# Patient Record
Sex: FEMALE | Race: WHITE | Employment: FULL TIME | URBAN - METROPOLITAN AREA
[De-identification: names, ages, dates, MRNs, and addresses within clinical notes are randomized per-mention and may not be internally consistent; named-entity substitution may affect disease eponyms.]

---

## 2019-09-24 ENCOUNTER — OFFICE VISIT (OUTPATIENT)
Dept: OBGYN CLINIC | Age: 24
End: 2019-09-24
Payer: COMMERCIAL

## 2019-09-24 VITALS
HEART RATE: 77 BPM | HEIGHT: 64 IN | BODY MASS INDEX: 23.37 KG/M2 | SYSTOLIC BLOOD PRESSURE: 100 MMHG | DIASTOLIC BLOOD PRESSURE: 63 MMHG | WEIGHT: 136.9 LBS

## 2019-09-24 DIAGNOSIS — N94.6 DYSMENORRHEA: ICD-10-CM

## 2019-09-24 DIAGNOSIS — Z30.432 ENCOUNTER FOR IUD REMOVAL: Primary | ICD-10-CM

## 2019-09-24 PROCEDURE — G8420 CALC BMI NORM PARAMETERS: HCPCS | Performed by: ADVANCED PRACTICE MIDWIFE

## 2019-09-24 PROCEDURE — 1036F TOBACCO NON-USER: CPT | Performed by: ADVANCED PRACTICE MIDWIFE

## 2019-09-24 PROCEDURE — G8427 DOCREV CUR MEDS BY ELIG CLIN: HCPCS | Performed by: ADVANCED PRACTICE MIDWIFE

## 2019-09-24 PROCEDURE — 58301 REMOVE INTRAUTERINE DEVICE: CPT | Performed by: ADVANCED PRACTICE MIDWIFE

## 2019-09-24 PROCEDURE — 99202 OFFICE O/P NEW SF 15 MIN: CPT | Performed by: ADVANCED PRACTICE MIDWIFE

## 2019-09-24 RX ORDER — NORETHINDRONE ACETATE AND ETHINYL ESTRADIOL 1MG-20(21)
1 KIT ORAL DAILY
Qty: 1 PACKET | Refills: 2 | Status: SHIPPED | OUTPATIENT
Start: 2019-09-24 | End: 2020-09-15 | Stop reason: ALTCHOICE

## 2019-09-24 ASSESSMENT — ENCOUNTER SYMPTOMS
ABDOMINAL PAIN: 0
DIARRHEA: 0
NAUSEA: 0
SHORTNESS OF BREATH: 0
VOMITING: 0

## 2020-09-11 ENCOUNTER — TELEPHONE (OUTPATIENT)
Dept: FAMILY MEDICINE CLINIC | Age: 25
End: 2020-09-11

## 2020-09-15 ENCOUNTER — OFFICE VISIT (OUTPATIENT)
Dept: OBGYN CLINIC | Age: 25
End: 2020-09-15

## 2020-09-15 VITALS
DIASTOLIC BLOOD PRESSURE: 69 MMHG | HEART RATE: 85 BPM | BODY MASS INDEX: 22.09 KG/M2 | WEIGHT: 129.4 LBS | SYSTOLIC BLOOD PRESSURE: 100 MMHG | HEIGHT: 64 IN

## 2020-09-15 PROBLEM — O09.90 HIGH-RISK PREGNANCY: Status: ACTIVE | Noted: 2020-09-15

## 2020-09-15 PROBLEM — Z98.891 HISTORY OF CESAREAN DELIVERY: Status: ACTIVE | Noted: 2020-09-15

## 2020-09-15 LAB
CONTROL: PRESENT
PREGNANCY TEST URINE, POC: POSITIVE

## 2020-09-15 PROCEDURE — 99213 OFFICE O/P EST LOW 20 MIN: CPT | Performed by: ADVANCED PRACTICE MIDWIFE

## 2020-09-15 PROCEDURE — 81025 URINE PREGNANCY TEST: CPT | Performed by: ADVANCED PRACTICE MIDWIFE

## 2020-09-15 RX ORDER — LANOLIN ALCOHOL/MO/W.PET/CERES
50 CREAM (GRAM) TOPICAL DAILY
COMMUNITY
End: 2021-09-22 | Stop reason: ALTCHOICE

## 2020-09-15 NOTE — PROGRESS NOTES
[facility] [address] [address]  Dept: [phone]    Patient Name: Stef Villaseñor  Patient Age: 23 y.o. Date of Visit: 9/15/2020    SUBJECTIVE:    Chief Complaint:  Chief Complaint   Patient presents with    Amenorrhea     lmp 20, urine pregnancy test positive       HPI:    Sirisha Hannon  is being seen today for missed menses. She reports a  positive pregnancy test on a week. This  is a planned pregnancy. She is  accepting at this time. LMP: Patient's last menstrual period was 2020 (exact date). Sure and definite: Yes    28 day cycle: Yes    She was not on a contraceptive at the time of conception. Estimated weeks gestation today : 5w5d  Tentative BERNADETTE: 2020    Relationship with FOB: germán    Patient reports concerns: no    OB History        3    Para   2    Term   2            AB        Living   2       SAB        TAB        Ectopic        Molar        Multiple        Live Births                  History reviewed. No pertinent past medical history. Current Outpatient Medications   Medication Sig Dispense Refill    vitamin B-6 (PYRIDOXINE) 50 MG tablet Take 50 mg by mouth daily       No current facility-administered medications for this visit.           OBJECTIVE:    /69 (Site: Right Upper Arm, Position: Sitting, Cuff Size: Medium Adult)   Pulse 85   Ht 5' 4\" (1.626 m)   Wt 129 lb 6.4 oz (58.7 kg)   LMP 2020 (Exact Date)   BMI 22.21 kg/m²     Mother's ethnicity:     Father's ethnicity:       She is complaining today of:   Pain: No  Cramping: No  Bleeding or spotting: No    Nausea: Yes  Vomiting: No    Breast enlargement/tenderness: No  Fatigue: No  Frequency of urination: No    Previous high risk obstetrical history: yes  OB History    Para Term  AB Living   3 2 2     2   SAB TAB Ectopic Molar Multiple Live Births                    # Outcome Date GA Lbr Leroy/2nd Weight

## 2020-09-23 ENCOUNTER — HOSPITAL ENCOUNTER (OUTPATIENT)
Dept: ULTRASOUND IMAGING | Facility: CLINIC | Age: 25
Discharge: HOME | End: 2020-09-25

## 2020-09-23 PROCEDURE — 76801 OB US < 14 WKS SINGLE FETUS: CPT

## 2020-09-23 PROCEDURE — 76817 TRANSVAGINAL US OBSTETRIC: CPT

## 2020-10-05 ENCOUNTER — HOSPITAL ENCOUNTER (EMERGENCY)
Facility: CLINIC | Age: 25
Discharge: HOME | End: 2020-10-06
Attending: EMERGENCY MEDICINE

## 2020-10-05 VITALS
WEIGHT: 125 LBS | HEART RATE: 75 BPM | RESPIRATION RATE: 16 BRPM | DIASTOLIC BLOOD PRESSURE: 77 MMHG | BODY MASS INDEX: 21.46 KG/M2 | TEMPERATURE: 98.4 F | SYSTOLIC BLOOD PRESSURE: 122 MMHG | OXYGEN SATURATION: 98 %

## 2020-10-05 PROCEDURE — 99284 EMERGENCY DEPT VISIT MOD MDM: CPT

## 2020-10-05 PROCEDURE — 99283 EMERGENCY DEPT VISIT LOW MDM: CPT

## 2020-10-05 ASSESSMENT — PAIN SCALES - GENERAL: PAINLEVEL_OUTOF10: 6

## 2020-10-05 ASSESSMENT — ENCOUNTER SYMPTOMS
RESPIRATORY NEGATIVE: 1
ABDOMINAL PAIN: 1
ALLERGIC/IMMUNOLOGIC NEGATIVE: 1
EYES NEGATIVE: 1
NAUSEA: 1

## 2020-10-05 ASSESSMENT — PAIN DESCRIPTION - LOCATION: LOCATION: ABDOMEN

## 2020-10-05 NOTE — LETTER
Harris Jolly was seen and treated in our emergency department on 10/5/2020. She may return to work on 10/7/2020    If you have any questions or concerns, please don't hesitate to call.       Shirlene Johnson RN

## 2020-10-06 ENCOUNTER — OFFICE VISIT (OUTPATIENT)
Dept: OBGYN CLINIC | Age: 25
End: 2020-10-06

## 2020-10-06 ENCOUNTER — HOSPITAL ENCOUNTER (OUTPATIENT)
Age: 25
Setting detail: SPECIMEN
Discharge: HOME | End: 2020-10-06

## 2020-10-06 VITALS
DIASTOLIC BLOOD PRESSURE: 66 MMHG | WEIGHT: 127.1 LBS | SYSTOLIC BLOOD PRESSURE: 100 MMHG | BODY MASS INDEX: 21.82 KG/M2 | HEART RATE: 70 BPM

## 2020-10-06 LAB
-: ABNORMAL
AMORPHOUS: ABNORMAL
BACTERIA: ABNORMAL
BILIRUBIN URINE: NEGATIVE
CASTS UA: ABNORMAL /LPF (ref 0–2)
COLOR: YELLOW
COMMENT UA: ABNORMAL
CRYSTALS, UA: ABNORMAL /HPF
EPITHELIAL CELLS UA: ABNORMAL /HPF (ref 0–5)
GLUCOSE URINE: NEGATIVE
KETONES, URINE: NEGATIVE
LEUKOCYTE ESTERASE, URINE: NEGATIVE
MUCUS: ABNORMAL
NITRITE, URINE: NEGATIVE
OTHER OBSERVATIONS UA: ABNORMAL
PH UA: 6.5 (ref 5–8)
PROTEIN UA: NEGATIVE
RBC UA: ABNORMAL /HPF (ref 0–2)
RENAL EPITHELIAL, UA: ABNORMAL /HPF
SPECIFIC GRAVITY UA: 1.03 (ref 1–1.03)
TRICHOMONAS: ABNORMAL
TURBIDITY: ABNORMAL
URINE HGB: ABNORMAL
UROBILINOGEN, URINE: ABNORMAL
WBC UA: ABNORMAL /HPF (ref 0–5)
YEAST: ABNORMAL

## 2020-10-06 PROCEDURE — 99213 OFFICE O/P EST LOW 20 MIN: CPT | Performed by: ADVANCED PRACTICE MIDWIFE

## 2020-10-06 PROCEDURE — 6370000000 HC RX 637 (ALT 250 FOR IP): Performed by: EMERGENCY MEDICINE

## 2020-10-06 PROCEDURE — 81001 URINALYSIS AUTO W/SCOPE: CPT

## 2020-10-06 RX ORDER — ACETAMINOPHEN 325 MG/1
650 TABLET ORAL ONCE
Status: COMPLETED | OUTPATIENT
Start: 2020-10-06 | End: 2020-10-06

## 2020-10-06 RX ADMIN — ACETAMINOPHEN 650 MG: 325 TABLET ORAL at 00:36

## 2020-10-06 ASSESSMENT — PAIN SCALES - GENERAL: PAINLEVEL_OUTOF10: 8

## 2020-10-06 NOTE — ED PROVIDER NOTES
eMERGENCY dEPARTMENT eNCOUnter      Pt Name: Alexis Jordan  MRN: [id number]  [place] 1995  Date of evaluation: 10/5/2020      CHIEF COMPLAINT       Chief Complaint   Patient presents with    Abdominal Pain     8 weeks pregnant          HISTORY OF PRESENT ILLNESS    Alexis Jordan is a 23 y.o. female who presents to the emergency department who is complaining of mid and upper abdominal pain. Patient is 8 weeks pregnant she has no vaginal discharge she has no urinary symptoms at this point in time she states that her appetite is been a little bit down. Does complain of some slight nausea as well. Patient has had an ultrasound at 6 weeks which showed a normal IUP. REVIEW OF SYSTEMS       Review of Systems   Constitutional: Negative. HENT: Negative. Eyes: Negative. Respiratory: Negative. Cardiovascular: Negative. Gastrointestinal: Positive for abdominal pain and nausea. Endocrine: Negative. Genitourinary: Negative. Musculoskeletal: Negative. Skin: Negative. Allergic/Immunologic: Negative. Neurological: Negative. Hematological: Negative. Psychiatric/Behavioral: Negative. PAST MEDICAL HISTORY    has no past medical history on file. SURGICAL HISTORY      has a past surgical history that includes  section () and  section (). CURRENT MEDICATIONS       Previous Medications    VITAMIN B-6 (PYRIDOXINE) 50 MG TABLET    Take 50 mg by mouth daily       ALLERGIES     has No Known Allergies. FAMILY HISTORY     She indicated that her maternal grandmother is . family history includes Cancer in her maternal grandmother. SOCIAL HISTORY      reports that she has never smoked. She has never used smokeless tobacco. She reports current alcohol use. She reports that she does not use drugs. PHYSICAL EXAM     INITIAL VITALS:  weight is 56.7 kg (125 lb). Her temperature is 98.4 °F (36.9 °C).  Her blood pressure is 122/77 and her pulse is 75. Her respiration is 16 and oxygen saturation is 98%. Constitutional: Alert, oriented x3, nontoxic, afebrile, answering questions appropriately, acting properly for age, in no acute distress  HEENT: Extraocular muscles intact, mucus membranes moist, TMs clear bilaterally, no posterior pharyngeal erythema or exudates, Pupils equal, round, reactive to light,   Neck: Trachea midline, Supple without lymphadenopathy, no posterior midline neck tenderness to palpation  Cardiovascular: Regular rhythm and rate no S3, S4, or murmurs  Respiratory: Clear to auscultation bilaterally no wheezes, rhonchi, rales, no respiratory distress  Gastrointestinal: Soft, nontender, nondistended, positive bowel sounds. No rebound, rigidity, or guarding. Musculoskeletal: No extremity pain or swelling  Neurologic: Moving all 4 extremities without difficulty there are no gross focal neurologic deficits  Skin: Warm and dry      DIFFERENTIAL DIAGNOSIS/ MDM:     Lower abdominal pain uncertain etiology.   Patient has had an ultrasound that showed a 6-week viable IUP so we not worried about an ectopic at this point time this may be a UTI or could be ovarian cyst.    DIAGNOSTIC RESULTS     EKG: All EKG's are interpreted by the Emergency Department Physician who either signs or Co-signs this chart in the absence of a cardiologist.        Not indicated unless otherwise documented above    LABS:  Results for orders placed or performed during the hospital encounter of 10/05/20   Urinalysis Reflex to Culture    Specimen: Urine, clean catch   Result Value Ref Range    Color, UA YELLOW YELLOW    Turbidity UA SLIGHTLY CLOUDY (A) CLEAR    Glucose, Ur NEGATIVE NEGATIVE    Bilirubin Urine NEGATIVE NEGATIVE    Ketones, Urine NEGATIVE NEGATIVE    Specific Gravity, UA 1.027 1.005 - 1.030    Urine Hgb TRACE (A) NEGATIVE    pH, UA 6.5 5.0 - 8.0    Protein, UA NEGATIVE NEGATIVE    Urobilinogen, Urine ELEVATED (A) Normal    Nitrite, Urine NEGATIVE NEGATIVE Leukocyte Esterase, Urine NEGATIVE NEGATIVE    Urinalysis Comments NOT REPORTED    Microscopic Urinalysis   Result Value Ref Range    -          WBC, UA 2 TO 5 0 - 5 /HPF    RBC, UA 2 TO 5 0 - 2 /HPF    Casts UA NOT REPORTED 0 - 2 /LPF    Crystals, UA NOT REPORTED None /HPF    Epithelial Cells UA 2 TO 5 0 - 5 /HPF    Renal Epithelial, UA NOT REPORTED 0 /HPF    Bacteria, UA MODERATE (A) None    Mucus, UA 2+ (A) None    Trichomonas, UA NOT REPORTED None    Amorphous, UA NOT REPORTED None    Other Observations UA (A) NOT REQ. Utilizing a urinalysis as the only screening method to exclude a potential uropathogen can be unreliable in many patient populations. Rapid screening tests are less sensitive than culture and if UTI is a clinical possibility, culture should be considered despite a negative urinalysis. Yeast, UA NOT REPORTED None       Not indicated unless otherwise documented above    RADIOLOGY:   I reviewed the radiologist interpretations:  No orders to display       Not indicated unless otherwise documented above    EMERGENCY DEPARTMENT COURSE:     The patient was given the following medications:  Orders Placed This Encounter   Medications    acetaminophen (TYLENOL) tablet 650 mg        Vitals:    Vitals:    10/05/20 2326 10/05/20 2329   BP: 122/77    Pulse: 75    Resp: 16    Temp: 98.4 °F (36.9 °C)    SpO2: 98%    Weight:  56.7 kg (125 lb)     -------------------------  /77   Pulse 75   Temp 98.4 °F (36.9 °C)   Resp 16   Wt 56.7 kg (125 lb)   LMP 08/06/2020 (Exact Date)   SpO2 98%   BMI 21.46 kg/m²         I have reviewed the disposition diagnosis with the patient and or their family/guardian. I have answered their questions and given discharge instructions. They voiced understanding of these instructions and did not have any further questions or complaints.     CRITICAL CARE:    None    CONSULTS:    None    PROCEDURES:    None      OARRS Report if indicated             FINAL IMPRESSION 1. Lower abdominal pain          DISPOSITION/PLAN   DISPOSITION Decision To Discharge    I have reviewed the disposition diagnosis with the patient and or their family/guardian. I have answered their questions and given discharge instructions. They voiced understanding of these instructions and did not have any further questions or complaints. Reevaluation:  Patient is feeling symptomatically better after Tylenol urinalysis does not show any obvious UTI there is some bacteria we will allow the midwife to put her on antibiotics tomorrow she needs to do that. Patient states she will go see the midwife tomorrow and feels comfortable going home tonight.     PATIENT REFERRED TO:  Kaleigh Haines, [address] [facility] [address]  [phone]    In 1 day        DISCHARGE MEDICATIONS:  New Prescriptions    No medications on file       (Please note that portions of this note were completed with a voice recognition program.  Efforts were made to edit the dictations but occasionally words are mis-transcribed.)    Bradley MD  Attending Emergency Physician            Rebeca Gannon MD  10/06/20 [id number]

## 2020-10-07 LAB
CULTURE: NORMAL
Lab: NORMAL
SPECIMEN DESCRIPTION: NORMAL

## 2020-10-12 ENCOUNTER — HOSPITAL ENCOUNTER (OUTPATIENT)
Age: 25
Setting detail: SPECIMEN
Discharge: HOME | End: 2020-10-12

## 2020-10-12 ENCOUNTER — INITIAL PRENATAL (OUTPATIENT)
Dept: OBGYN CLINIC | Age: 25
End: 2020-10-12

## 2020-10-12 VITALS
DIASTOLIC BLOOD PRESSURE: 69 MMHG | HEART RATE: 73 BPM | TEMPERATURE: 97.3 F | BODY MASS INDEX: 22.21 KG/M2 | SYSTOLIC BLOOD PRESSURE: 108 MMHG | WEIGHT: 129.4 LBS

## 2020-10-12 LAB
AMPHETAMINE SCREEN URINE: NEGATIVE
BARBITURATE SCREEN URINE: NEGATIVE
BENZODIAZEPINE SCREEN, URINE: NEGATIVE
BUPRENORPHINE URINE: NORMAL
CANNABINOID SCREEN URINE: NEGATIVE
COCAINE METABOLITE, URINE: NEGATIVE
MDMA URINE: NORMAL
METHADONE SCREEN, URINE: NEGATIVE
METHAMPHETAMINE, URINE: NORMAL
OPIATES, URINE: NEGATIVE
OXYCODONE SCREEN URINE: NEGATIVE
PHENCYCLIDINE, URINE: NEGATIVE
PROPOXYPHENE, URINE: NORMAL
TEST INFORMATION: NORMAL
TRICYCLIC ANTIDEPRESSANTS, UR: NORMAL

## 2020-10-12 PROCEDURE — 99211 OFF/OP EST MAY X REQ PHY/QHP: CPT | Performed by: ADVANCED PRACTICE MIDWIFE

## 2020-10-12 PROCEDURE — 36415 COLL VENOUS BLD VENIPUNCTURE: CPT | Performed by: ADVANCED PRACTICE MIDWIFE

## 2020-10-12 NOTE — LETTER
[facility]) [facility]   [address] [address] [address] [address]  Phone: [phone]  Fax: [phone]        October 12, 2020     Patient: Juice Hernandez   YOB: 1995   Date of Visit: 10/12/2020       To Whom it May Concern:    Juice Hernandez was seen in my clinic on 10/12/2020. She is currently pregnant. It is my medical opinion that she not lift greater than 20 lbs. If you have any questions or concerns, please don't hesitate to call. Sincerely,         BRENNEN Jacques

## 2020-10-12 NOTE — PROGRESS NOTES
CC: ER follow up    HPI: Josesito Dumont presents to the office for an ER follow up. She was seen in the ER 10/5/20 for abdominal pain. States they did not do any US and she does not know if her baby is ok or not. The abdominal pain she describes is epigastric and gets worse after eating. Was told in ER and per ER notes that UA was not high suspicion for UTI but if we wanted her to have antibiotics CNM would prescribe. Pt denies UTI s/s currently. Pt 8 weeks 5days pregnant. Denies vaginal bleeding.     O:./66 (Site: Left Upper Arm, Position: Supine, Cuff Size: Medium Adult)   Pulse 70   Wt 127 lb 1.6 oz (57.7 kg)   LMP 08/06/2020 (Exact Date)   BMI 21.82 kg/m²   Abdomen soft non tender to touch  FHTs present by BSUS    A: Abdominal pain   Likely GERD  +FHTs    P: discussed OTC remedies for GERD and diet precautions  Needs OB hx appt f/u in 1 week  UC ordered but low suspicion for UTI  Warning signs reviewed    More than 50% of 15min appt was spent counseling on the above
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
## 2020-10-12 NOTE — LETTER
[facility]   [address] [address] [address] [address]  Phone: [phone]  Fax: [phone]        October 12, 2020     Patient: Ankur New   YOB: 1995   Date of Visit: 10/12/2020       To Whom it May Concern:    Ankur New was seen in my clinic on 10/12/2020. She is pregnant with an estimated due date of May 13, 2021. If you have any questions or concerns, please don't hesitate to call.     Sincerely,         Mirela Nolen RN

## 2020-10-13 LAB
ABO/RH: NORMAL
ABSOLUTE EOS #: 0.05 K/UL (ref 0–0.44)
ABSOLUTE IMMATURE GRANULOCYTE: <0.03 K/UL (ref 0–0.3)
ABSOLUTE LYMPH #: 2.22 K/UL (ref 1.1–3.7)
ABSOLUTE MONO #: 0.56 K/UL (ref 0.1–1.2)
ANTIBODY SCREEN: NEGATIVE
BASOPHILS # BLD: 0 % (ref 0–2)
BASOPHILS ABSOLUTE: 0.03 K/UL (ref 0–0.2)
CULTURE: NO GROWTH
DIFFERENTIAL TYPE: ABNORMAL
EOSINOPHILS RELATIVE PERCENT: 1 % (ref 1–4)
HCT VFR BLD CALC: 38.5 % (ref 36.3–47.1)
HEMOGLOBIN: 12.2 G/DL (ref 11.9–15.1)
HEPATITIS B SURFACE ANTIGEN: NONREACTIVE
HIV AG/AB: NONREACTIVE
IMMATURE GRANULOCYTES: 0 %
LYMPHOCYTES # BLD: 25 % (ref 24–43)
Lab: NORMAL
MCH RBC QN AUTO: 28.8 PG (ref 25.2–33.5)
MCHC RBC AUTO-ENTMCNC: 31.7 G/DL (ref 28.4–34.8)
MCV RBC AUTO: 91 FL (ref 82.6–102.9)
MONOCYTES # BLD: 6 % (ref 3–12)
NRBC AUTOMATED: 0 PER 100 WBC
PDW BLD-RTO: 13.3 % (ref 11.8–14.4)
PLATELET # BLD: 181 K/UL (ref 138–453)
PLATELET ESTIMATE: ABNORMAL
PMV BLD AUTO: 11.9 FL (ref 8.1–13.5)
RBC # BLD: 4.23 M/UL (ref 3.95–5.11)
RBC # BLD: ABNORMAL 10*6/UL
RUBV IGG SER QL: 98.7 IU/ML
SEG NEUTROPHILS: 68 % (ref 36–65)
SEGMENTED NEUTROPHILS ABSOLUTE COUNT: 5.88 K/UL (ref 1.5–8.1)
SPECIMEN DESCRIPTION: NORMAL
T. PALLIDUM, IGG: NONREACTIVE
WBC # BLD: 8.8 K/UL (ref 3.5–11.3)
WBC # BLD: ABNORMAL 10*3/UL

## 2020-10-15 LAB
C. TRACHOMATIS DNA ,URINE: NEGATIVE
N. GONORRHOEAE DNA, URINE: NEGATIVE
SPECIMEN DESCRIPTION: NORMAL

## 2020-10-19 ENCOUNTER — TELEPHONE (OUTPATIENT)
Dept: OBGYN CLINIC | Age: 25
End: 2020-10-19

## 2020-10-19 NOTE — TELEPHONE ENCOUNTER
Onelia not set up  Call pt to review prenatal labs normal  Just waiting on cystic fibrosis lab, we will have it back by her next visit.  Thanks

## 2020-10-21 LAB — CYSTIC FIBROSIS: NORMAL

## 2020-10-29 ENCOUNTER — ROUTINE PRENATAL (OUTPATIENT)
Dept: OBGYN CLINIC | Age: 25
End: 2020-10-29

## 2020-10-29 ENCOUNTER — HOSPITAL ENCOUNTER (OUTPATIENT)
Age: 25
Discharge: HOME | End: 2020-10-29

## 2020-10-29 VITALS
DIASTOLIC BLOOD PRESSURE: 70 MMHG | SYSTOLIC BLOOD PRESSURE: 110 MMHG | WEIGHT: 128.2 LBS | BODY MASS INDEX: 22.01 KG/M2 | HEART RATE: 86 BPM

## 2020-10-29 PROBLEM — Z62.810 HISTORY OF SEXUAL ABUSE IN CHILDHOOD: Status: ACTIVE | Noted: 2020-10-29

## 2020-10-29 PROCEDURE — 99212 OFFICE O/P EST SF 10 MIN: CPT | Performed by: ADVANCED PRACTICE MIDWIFE

## 2020-10-29 PROCEDURE — G0145 SCR C/V CYTO,THINLAYER,RESCR: HCPCS

## 2020-10-29 PROCEDURE — G0123 SCREEN CERV/VAG THIN LAYER: HCPCS

## 2020-10-29 PROCEDURE — 36415 COLL VENOUS BLD VENIPUNCTURE: CPT

## 2020-10-29 PROCEDURE — 87624 HPV HI-RISK TYP POOLED RSLT: CPT

## 2020-10-29 RX ORDER — OMEPRAZOLE 20 MG/1
20 CAPSULE, DELAYED RELEASE ORAL
Qty: 30 CAPSULE | Refills: 5 | Status: SHIPPED | OUTPATIENT
Start: 2020-10-29 | End: 2021-09-22

## 2020-10-29 SDOH — ECONOMIC STABILITY: INCOME INSECURITY: HOW HARD IS IT FOR YOU TO PAY FOR THE VERY BASICS LIKE FOOD, HOUSING, MEDICAL CARE, AND HEATING?: NOT HARD AT ALL

## 2020-10-29 SDOH — ECONOMIC STABILITY: FOOD INSECURITY: WITHIN THE PAST 12 MONTHS, YOU WORRIED THAT YOUR FOOD WOULD RUN OUT BEFORE YOU GOT MONEY TO BUY MORE.: NEVER TRUE

## 2020-10-29 SDOH — ECONOMIC STABILITY: TRANSPORTATION INSECURITY
IN THE PAST 12 MONTHS, HAS THE LACK OF TRANSPORTATION KEPT YOU FROM MEDICAL APPOINTMENTS OR FROM GETTING MEDICATIONS?: NO

## 2020-10-29 SDOH — ECONOMIC STABILITY: FOOD INSECURITY: WITHIN THE PAST 12 MONTHS, THE FOOD YOU BOUGHT JUST DIDN'T LAST AND YOU DIDN'T HAVE MONEY TO GET MORE.: NEVER TRUE

## 2020-10-29 SDOH — ECONOMIC STABILITY: TRANSPORTATION INSECURITY
IN THE PAST 12 MONTHS, HAS LACK OF TRANSPORTATION KEPT YOU FROM MEETINGS, WORK, OR FROM GETTING THINGS NEEDED FOR DAILY LIVING?: NO
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
## 2020-11-06 ENCOUNTER — ROUTINE PRENATAL (OUTPATIENT)
Dept: PERINATAL CARE | Age: 25
End: 2020-11-06

## 2020-11-06 VITALS
RESPIRATION RATE: 16 BRPM | HEART RATE: 100 BPM | BODY MASS INDEX: 21.97 KG/M2 | WEIGHT: 128 LBS | TEMPERATURE: 98.3 F | SYSTOLIC BLOOD PRESSURE: 101 MMHG | DIASTOLIC BLOOD PRESSURE: 67 MMHG

## 2020-11-06 LAB
CRL: NORMAL
CYTOLOGY REPORT: NORMAL
SAC DIAMETER: NORMAL

## 2020-11-06 PROCEDURE — 76813 OB US NUCHAL MEAS 1 GEST: CPT | Performed by: OBSTETRICS & GYNECOLOGY

## 2020-11-06 PROCEDURE — 76801 OB US < 14 WKS SINGLE FETUS: CPT | Performed by: OBSTETRICS & GYNECOLOGY

## 2020-11-12 LAB
HPV SOURCE: NORMAL
HPV, GENOTYPE 16: NOT DETECTED
HPV, GENOTYPE 18: DETECTED
HPV, HIGH RISK OTHER: DETECTED

## 2020-11-13 ENCOUNTER — TELEPHONE (OUTPATIENT)
Dept: OBGYN CLINIC | Age: 25
End: 2020-11-13

## 2020-11-13 PROBLEM — R87.610 ASCUS WITH POSITIVE HIGH RISK HPV CERVICAL: Status: ACTIVE | Noted: 2020-11-13

## 2020-11-13 PROBLEM — R87.810 ASCUS WITH POSITIVE HIGH RISK HPV CERVICAL: Status: ACTIVE | Noted: 2020-11-13

## 2020-12-08 ENCOUNTER — ROUTINE PRENATAL (OUTPATIENT)
Dept: OBGYN CLINIC | Age: 25
End: 2020-12-08

## 2020-12-08 VITALS
WEIGHT: 129 LBS | HEART RATE: 109 BPM | DIASTOLIC BLOOD PRESSURE: 83 MMHG | BODY MASS INDEX: 22.14 KG/M2 | SYSTOLIC BLOOD PRESSURE: 125 MMHG

## 2020-12-08 PROCEDURE — 99212 OFFICE O/P EST SF 10 MIN: CPT | Performed by: ADVANCED PRACTICE MIDWIFE

## 2020-12-08 ASSESSMENT — PATIENT HEALTH QUESTIONNAIRE - PHQ9
SUM OF ALL RESPONSES TO PHQ QUESTIONS 1-9: 0
1. LITTLE INTEREST OR PLEASURE IN DOING THINGS: 0
SUM OF ALL RESPONSES TO PHQ9 QUESTIONS 1 & 2: 0
2. FEELING DOWN, DEPRESSED OR HOPELESS: 0

## 2020-12-08 NOTE — LETTER
[address]  [address]  Phone: [phone]  Fax: [phone]    MARICRUZ Gong - OG        December 8, 2020     Patient: David Chandler   YOB: 1995   Date of Visit: 12/8/2020       To Whom It May Concern: It is my medical opinion that David Chandler may return to work on 12-14-20 with the following restrictions: pushing/pulling not to exceed 15 lb, and not more then 30 hrs per week lbs. .    If you have any questions or concerns, please don't hesitate to call.     Sincerely,        Lucien Gong

## 2020-12-08 NOTE — PROGRESS NOTES
SUBJECTIVE:    Ta Weinstein is here for her return OB visit. Reports having severe pelvic pain and talked with her work and desires to take this week off and reduce her hours to 30 from 40 hours. She reports  feeling fetal movement. She denies vaginal bleeding. She denies vaginal discharge. She denies leaking of fluid. She denies uterine cramping. She denies  nausea and/or vomiting. OBJECTIVE:  Blood pressure 125/83, pulse 109, weight 129 lb (58.5 kg), last menstrual period 08/06/2020. Total weight gain: 2 lb (0.907 kg)    Emily has not received the flu vaccine as appropriate. Declined offer at next visit  Ta Weinstein has not received the Tdap vaccine as appropriate    ASSESSMENT/PLAN:  IUP @ 17+5 weeks  S =D    1. High risk pregnancy, antepartum      2. 17 weeks gestation of pregnancy  - Reviewed fetal movement  - First trimester screen WNL  - Anatomy scan scheduled   - Prenatal labs completed  - Discussed flu shot, declines today     3. Pelvic pain affecting pregnancy in second trimester, antepartum  - Reviewed tylenol PRN  - Medical grade band ordered  - Work note given        She was counseled regarding all of the above:    Return in about 4 weeks (around 1/5/2021) for Return OB. Patient was seen with total face to face time of 15 minutes. More than 50% of this visit was education and counseling.     Electronically Signed By: Lucien Cline

## 2021-01-04 ENCOUNTER — ROUTINE PRENATAL (OUTPATIENT)
Dept: PERINATAL CARE | Age: 26
End: 2021-01-04

## 2021-01-04 VITALS
RESPIRATION RATE: 16 BRPM | TEMPERATURE: 97.2 F | WEIGHT: 136 LBS | BODY MASS INDEX: 23.22 KG/M2 | SYSTOLIC BLOOD PRESSURE: 93 MMHG | HEART RATE: 61 BPM | DIASTOLIC BLOOD PRESSURE: 57 MMHG | HEIGHT: 64 IN

## 2021-01-04 DIAGNOSIS — Z36.86 SCREENING, ANTENATAL, FOR RISK OF PRE-TERM LABOR: ICD-10-CM

## 2021-01-04 DIAGNOSIS — Z3A.21 21 WEEKS GESTATION OF PREGNANCY: ICD-10-CM

## 2021-01-04 DIAGNOSIS — Z13.89 ENCOUNTER FOR ROUTINE SCREENING FOR MALFORMATION USING ULTRASONICS: Primary | ICD-10-CM

## 2021-01-04 PROCEDURE — 76805 OB US >/= 14 WKS SNGL FETUS: CPT | Performed by: OBSTETRICS & GYNECOLOGY

## 2021-01-04 PROCEDURE — 76817 TRANSVAGINAL US OBSTETRIC: CPT | Performed by: OBSTETRICS & GYNECOLOGY

## 2021-01-28 ENCOUNTER — ROUTINE PRENATAL (OUTPATIENT)
Dept: OBGYN CLINIC | Age: 26
End: 2021-01-28
Payer: COMMERCIAL

## 2021-01-28 VITALS
WEIGHT: 142.4 LBS | SYSTOLIC BLOOD PRESSURE: 101 MMHG | HEART RATE: 80 BPM | DIASTOLIC BLOOD PRESSURE: 67 MMHG | BODY MASS INDEX: 24.44 KG/M2

## 2021-01-28 DIAGNOSIS — Z98.891 HISTORY OF CESAREAN DELIVERY: ICD-10-CM

## 2021-01-28 DIAGNOSIS — Z23 NEEDS FLU SHOT: ICD-10-CM

## 2021-01-28 DIAGNOSIS — Z3A.25 25 WEEKS GESTATION OF PREGNANCY: ICD-10-CM

## 2021-01-28 DIAGNOSIS — O09.92 SUPERVISION OF HIGH RISK PREGNANCY IN SECOND TRIMESTER: Primary | ICD-10-CM

## 2021-01-28 PROCEDURE — 99213 OFFICE O/P EST LOW 20 MIN: CPT | Performed by: ADVANCED PRACTICE MIDWIFE

## 2021-02-01 PROBLEM — Z23 NEEDS FLU SHOT: Status: ACTIVE | Noted: 2021-02-01

## 2021-02-02 ENCOUNTER — TELEPHONE (OUTPATIENT)
Dept: OBGYN CLINIC | Age: 26
End: 2021-02-02

## 2021-02-02 NOTE — TELEPHONE ENCOUNTER
Attempted to reach pt. VM is full. Pt needs scheduled w/ Dr. Iris Roblero for C/S consult 2 wks after she sees Sanaz Arreola on 2/25/21.

## 2021-02-15 ENCOUNTER — TELEPHONE (OUTPATIENT)
Dept: OBGYN CLINIC | Age: 26
End: 2021-02-15

## 2021-02-15 NOTE — TELEPHONE ENCOUNTER
Attempted to reach pt to schedule her to see the physicians after her next appt. Pt's VM is full. Unable to leave message. Will send Internt message.

## 2021-02-22 ENCOUNTER — TELEPHONE (OUTPATIENT)
Dept: OBGYN CLINIC | Age: 26
End: 2021-02-22

## 2021-02-22 NOTE — TELEPHONE ENCOUNTER
Attempted to reach pt once again to schedule w/ Dr. Suad Puente for consult. VM is full. Unable to leave message.

## 2021-02-22 NOTE — TELEPHONE ENCOUNTER
----- Message from Jo Lincoln, APRN - CNM sent at 2021  2:49 PM EST -----  Regarding: repeat   Can you make sure she gets on W/S schedule for 2 weeks after she sees me next for doc consult for section  Thanks    [facility]

## 2021-02-25 ENCOUNTER — HOSPITAL ENCOUNTER (OUTPATIENT)
Age: 26
Setting detail: SPECIMEN
Discharge: HOME | End: 2021-02-25
Payer: COMMERCIAL

## 2021-02-25 ENCOUNTER — ROUTINE PRENATAL (OUTPATIENT)
Dept: OBGYN CLINIC | Age: 26
End: 2021-02-25
Payer: COMMERCIAL

## 2021-02-25 VITALS
HEART RATE: 94 BPM | DIASTOLIC BLOOD PRESSURE: 70 MMHG | SYSTOLIC BLOOD PRESSURE: 104 MMHG | WEIGHT: 147.8 LBS | BODY MASS INDEX: 25.37 KG/M2

## 2021-02-25 DIAGNOSIS — Z3A.29 29 WEEKS GESTATION OF PREGNANCY: ICD-10-CM

## 2021-02-25 DIAGNOSIS — F32.A DEPRESSION AFFECTING PREGNANCY IN THIRD TRIMESTER, ANTEPARTUM: ICD-10-CM

## 2021-02-25 DIAGNOSIS — O99.343 DEPRESSION AFFECTING PREGNANCY IN THIRD TRIMESTER, ANTEPARTUM: ICD-10-CM

## 2021-02-25 DIAGNOSIS — O09.93 HIGH-RISK PREGNANCY IN THIRD TRIMESTER: Primary | ICD-10-CM

## 2021-02-25 DIAGNOSIS — Z98.891 HISTORY OF CESAREAN DELIVERY: ICD-10-CM

## 2021-02-25 DIAGNOSIS — O99.013 ANEMIA DURING PREGNANCY IN THIRD TRIMESTER: ICD-10-CM

## 2021-02-25 DIAGNOSIS — O09.92 SUPERVISION OF HIGH RISK PREGNANCY IN SECOND TRIMESTER: ICD-10-CM

## 2021-02-25 DIAGNOSIS — Z3A.25 25 WEEKS GESTATION OF PREGNANCY: ICD-10-CM

## 2021-02-25 LAB
ABSOLUTE EOS #: 0.05 K/UL (ref 0–0.44)
ABSOLUTE IMMATURE GRANULOCYTE: 0.07 K/UL (ref 0–0.3)
ABSOLUTE LYMPH #: 1.5 K/UL (ref 1.1–3.7)
ABSOLUTE MONO #: 0.54 K/UL (ref 0.1–1.2)
BASOPHILS # BLD: 1 % (ref 0–2)
BASOPHILS ABSOLUTE: 0.04 K/UL (ref 0–0.2)
DIFFERENTIAL TYPE: ABNORMAL
EOSINOPHILS RELATIVE PERCENT: 1 % (ref 1–4)
HCT VFR BLD CALC: 31 % (ref 36.3–47.1)
HEMOGLOBIN: 9.8 G/DL (ref 11.9–15.1)
IMMATURE GRANULOCYTES: 1 %
LYMPHOCYTES # BLD: 17 % (ref 24–43)
MCH RBC QN AUTO: 27.9 PG (ref 25.2–33.5)
MCHC RBC AUTO-ENTMCNC: 31.6 G/DL (ref 28.4–34.8)
MCV RBC AUTO: 88.3 FL (ref 82.6–102.9)
MONOCYTES # BLD: 6 % (ref 3–12)
NRBC AUTOMATED: 0 PER 100 WBC
PDW BLD-RTO: 12.8 % (ref 11.8–14.4)
PLATELET # BLD: 167 K/UL (ref 138–453)
PLATELET ESTIMATE: ABNORMAL
PMV BLD AUTO: 10.9 FL (ref 8.1–13.5)
RBC # BLD: 3.51 M/UL (ref 3.95–5.11)
RBC # BLD: ABNORMAL 10*6/UL
SEG NEUTROPHILS: 74 % (ref 36–65)
SEGMENTED NEUTROPHILS ABSOLUTE COUNT: 6.59 K/UL (ref 1.5–8.1)
WBC # BLD: 8.8 K/UL (ref 3.5–11.3)
WBC # BLD: ABNORMAL 10*3/UL

## 2021-02-25 PROCEDURE — 99213 OFFICE O/P EST LOW 20 MIN: CPT | Performed by: ADVANCED PRACTICE MIDWIFE

## 2021-02-25 RX ORDER — SERTRALINE HYDROCHLORIDE 25 MG/1
25 TABLET, FILM COATED ORAL DAILY
Qty: 30 TABLET | Refills: 3 | Status: SHIPPED | OUTPATIENT
Start: 2021-02-25 | End: 2021-03-24

## 2021-02-25 SDOH — ECONOMIC STABILITY: INCOME INSECURITY: HOW HARD IS IT FOR YOU TO PAY FOR THE VERY BASICS LIKE FOOD, HOUSING, MEDICAL CARE, AND HEATING?: NOT HARD AT ALL

## 2021-02-25 ASSESSMENT — PATIENT HEALTH QUESTIONNAIRE - PHQ9
SUM OF ALL RESPONSES TO PHQ QUESTIONS 1-9: 0
1. LITTLE INTEREST OR PLEASURE IN DOING THINGS: 0

## 2021-02-25 NOTE — PROGRESS NOTES
Performed venipuncture of the __ arm. Pt tolerated __. Blood Flow ___.   Collected _ -SST _ -Lav      Pt finished drinking 50g of glucose beverage at 2:07pm    LOT: [id number]  EXP: 8/31/22

## 2021-02-25 NOTE — PROGRESS NOTES
SUBJECTIVE:  Donna Inman is here for her return OB visit. She reports fetal movement. She denies  vaginal bleeding. She denies  vaginal discharge. She denies leaking of fluid. She denies uterine contraction activity. She denies nausea and/or vomiting. She denies retaining fluid in her extremities. Reports increase in depression symptoms. States she is struggling. OBJECTIVE:  Blood pressure 104/70, pulse 94, weight 147 lb 12.8 oz (67 kg), last menstrual period 2020. Donna Inman has not received the flu vaccine as appropriate  Emily has not received the Tdap vaccine as appropriate        ASSESSMENT/PLAN:  1. High-risk pregnancy in third trimester  IUP at 29w0d  S=D  Warning signs reviewed  Did Aung at time of visit    2. 29 weeks gestation of pregnancy      3. Depression affecting pregnancy in third trimester, antepartum  -Reviewed EDS 10  -Pt states she is ready to start meds  - sertraline (ZOLOFT) 25 MG tablet; Take 1 tablet by mouth daily  Dispense: 30 tablet; Refill: 3    4. History of  delivery x2  -Needs appt with Dr. Samuel Mccloud for scheduling repeat    5. Anemia during pregnancy in third trimester  -CBC drawn at time of visit      The problem list was reviewed and updated with any new issues from today's visit      Donna Inman will monitor fetal movement daily. 28 week lab results were reviewed. Fetal Kick Count was discussed and explained. Cayey-Cain contractions vs  labor contractions were reviewed.   Signs and symptoms of Pre-Eclampsia were were reviewed and discussed  Initial discussion regarding birth plans was begun    Donna Inman was counseled regarding all of the above

## 2021-02-26 ENCOUNTER — TELEPHONE (OUTPATIENT)
Dept: OBGYN CLINIC | Age: 26
End: 2021-02-26

## 2021-02-26 DIAGNOSIS — O99.013 ANEMIA DURING PREGNANCY IN THIRD TRIMESTER: Primary | ICD-10-CM

## 2021-02-26 LAB
GLUCOSE ADMINISTRATION: NORMAL
GLUCOSE TOLERANCE SCREEN 50G: 129 MG/DL (ref 70–135)

## 2021-02-26 RX ORDER — FERROUS SULFATE 325(65) MG
325 TABLET ORAL 2 TIMES DAILY
Qty: 60 TABLET | Refills: 1 | Status: SHIPPED | OUTPATIENT
Start: 2021-02-26 | End: 2021-09-22 | Stop reason: ALTCHOICE

## 2021-03-11 ENCOUNTER — ROUTINE PRENATAL (OUTPATIENT)
Dept: OBGYN CLINIC | Age: 26
End: 2021-03-11

## 2021-03-11 VITALS
HEART RATE: 92 BPM | SYSTOLIC BLOOD PRESSURE: 106 MMHG | BODY MASS INDEX: 26.26 KG/M2 | DIASTOLIC BLOOD PRESSURE: 67 MMHG | WEIGHT: 153 LBS | TEMPERATURE: 97.2 F

## 2021-03-11 DIAGNOSIS — O99.343 DEPRESSION AFFECTING PREGNANCY IN THIRD TRIMESTER, ANTEPARTUM: ICD-10-CM

## 2021-03-11 DIAGNOSIS — F32.A DEPRESSION AFFECTING PREGNANCY IN THIRD TRIMESTER, ANTEPARTUM: ICD-10-CM

## 2021-03-11 DIAGNOSIS — Z98.891 HISTORY OF CESAREAN DELIVERY: ICD-10-CM

## 2021-03-11 DIAGNOSIS — Z62.810 HISTORY OF SEXUAL ABUSE IN CHILDHOOD: ICD-10-CM

## 2021-03-11 DIAGNOSIS — O99.013 ANEMIA DURING PREGNANCY IN THIRD TRIMESTER: ICD-10-CM

## 2021-03-11 DIAGNOSIS — Z3A.31 31 WEEKS GESTATION OF PREGNANCY: ICD-10-CM

## 2021-03-11 DIAGNOSIS — O09.93 SUPERVISION OF HIGH RISK PREGNANCY IN THIRD TRIMESTER: Primary | ICD-10-CM

## 2021-03-11 PROCEDURE — 99212 OFFICE O/P EST SF 10 MIN: CPT | Performed by: ADVANCED PRACTICE MIDWIFE
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
## 2021-03-24 ENCOUNTER — ROUTINE PRENATAL (OUTPATIENT)
Dept: OBGYN CLINIC | Age: 26
End: 2021-03-24
Payer: COMMERCIAL

## 2021-03-24 VITALS
SYSTOLIC BLOOD PRESSURE: 104 MMHG | HEART RATE: 103 BPM | BODY MASS INDEX: 26.19 KG/M2 | DIASTOLIC BLOOD PRESSURE: 74 MMHG | WEIGHT: 152.6 LBS

## 2021-03-24 DIAGNOSIS — Z34.93 PRENATAL CARE IN THIRD TRIMESTER: Primary | ICD-10-CM

## 2021-03-24 DIAGNOSIS — Z3A.32 32 WEEKS GESTATION OF PREGNANCY: ICD-10-CM

## 2021-03-24 DIAGNOSIS — F32.A DEPRESSION AFFECTING PREGNANCY IN THIRD TRIMESTER, ANTEPARTUM: ICD-10-CM

## 2021-03-24 DIAGNOSIS — O99.019 ANEMIA DURING PREGNANCY: ICD-10-CM

## 2021-03-24 DIAGNOSIS — O99.343 DEPRESSION AFFECTING PREGNANCY IN THIRD TRIMESTER, ANTEPARTUM: ICD-10-CM

## 2021-03-24 DIAGNOSIS — Z98.891 HISTORY OF CESAREAN DELIVERY: ICD-10-CM

## 2021-03-24 PROCEDURE — 99212 OFFICE O/P EST SF 10 MIN: CPT | Performed by: OBSTETRICS & GYNECOLOGY
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
## 2021-03-24 NOTE — PATIENT INSTRUCTIONS
Patient Education        Weeks 32 to 29 of Your Pregnancy: Care Instructions  Overview     During the last few weeks of your pregnancy, you may have more aches and pains. It's important to rest when you can. Your growing baby is putting more pressure on your bladder. So you may need to urinate more often. Hemorrhoids are also common. These are painful, itchy veins in the rectal area. You may want to talk with your doctor about banking your baby's umbilical cord blood. This is the blood left in the cord after birth. If you want to save this blood, you must arrange it ahead of time. You can't decide at the last minute. If you haven't already had the Tdap shot during this pregnancy, talk to your doctor about getting it. It will help protect your  against pertussis infection. Follow-up care is a key part of your treatment and safety. Be sure to make and go to all appointments, and call your doctor if you are having problems. It's also a good idea to know your test results and keep a list of the medicines you take. How can you care for yourself at home? Ease hemorrhoids  · Get more liquids, fruits, vegetables, and fiber in your diet. This will help keep your stools soft. · Avoid sitting for too long. Lie on your left side several times a day. · Clean yourself with soft, moist toilet paper. Or you can use witch hazel pads or personal hygiene pads. · If you are uncomfortable, try ice packs. Or you can sit in a warm sitz bath. Do these for 20 minutes at a time, as needed. · Use hydrocortisone cream for pain and itching. Two examples are Anusol and Preparation H Hydrocortisone. · Ask your doctor about taking an over-the-counter stool softener. Consider breastfeeding  · Experts recommend that women breastfeed for 1 year or longer. · Breast milk may help protect your child from some health problems.   babies are less likely than formula-fed babies to:  ? Get ear infections, colds, diarrhea, and pneumonia. ? Be obese or get diabetes later in life. · Women who breastfeed have less bleeding after the birth. Their uteruses also shrink back faster. · Some women who breastfeed lose weight faster. Making milk burns calories. · Breastfeeding can lower your risk of breast cancer, ovarian cancer, and osteoporosis. Decide about circumcision for boys  · As you make this decision, it may help to think about your personal, Nondenominational, and family traditions. You get to decide if you will keep your son's penis natural or if he will be circumcised. · If you decide that you would like to have your baby circumcised, talk with your doctor. You can share your concerns about pain. And you can discuss your preferences for anesthesia. Where can you learn more? Go to https://[email or web address]. org and sign in to your [employer] account. Enter Z739 in the ROIÂ² box to learn more about \"Weeks 32 to 34 of Your Pregnancy: Care Instructions. \"     If you do not have an account, please click on the \"Sign Up Now\" link. Current as of: October 8, 2020               Content Version: 12.8  © 3100-1072 Healthwise, Incorporated. Care instructions adapted under license by [facility] ([facility]). If you have questions about a medical condition or this instruction, always ask your healthcare professional. [address] any warranty or liability for your use of this information.

## 2021-03-24 NOTE — Clinical Note
Please schedule repeat  with me on  at 11 or 1130am. When notifying the patient of the date, please apologize for me because I thought I could schedule it on  but I forgot we have surgeries at [facility]. If she still really wants , we will have to schedule it for later in the day. Thanks!

## 2021-04-07 PROBLEM — Z23 NEED FOR DIPHTHERIA-TETANUS-PERTUSSIS (TDAP) VACCINE: Status: ACTIVE | Noted: 2021-04-07

## 2021-04-19 PROBLEM — O43.199 MARGINAL INSERTION OF UMBILICAL CORD AFFECTING MANAGEMENT OF MOTHER: Status: ACTIVE | Noted: 2021-04-19

## 2021-05-05 ENCOUNTER — TELEPHONE (OUTPATIENT)
Dept: OBGYN CLINIC | Age: 26
End: 2021-05-05

## 2021-06-17 ENCOUNTER — PROCEDURE VISIT (OUTPATIENT)
Dept: OBGYN CLINIC | Age: 26
End: 2021-06-17
Payer: COMMERCIAL

## 2021-06-17 VITALS
HEART RATE: 62 BPM | WEIGHT: 136.8 LBS | HEIGHT: 64 IN | DIASTOLIC BLOOD PRESSURE: 73 MMHG | SYSTOLIC BLOOD PRESSURE: 109 MMHG | BODY MASS INDEX: 23.35 KG/M2

## 2021-06-17 DIAGNOSIS — N94.6 DYSMENORRHEA: ICD-10-CM

## 2021-06-17 DIAGNOSIS — Z97.5 IUD (INTRAUTERINE DEVICE) IN PLACE: ICD-10-CM

## 2021-06-17 DIAGNOSIS — Z30.430 ENCOUNTER FOR INSERTION OF MIRENA IUD: Primary | ICD-10-CM

## 2021-06-17 PROBLEM — O09.90 HIGH-RISK PREGNANCY: Status: RESOLVED | Noted: 2020-09-15 | Resolved: 2021-06-17

## 2021-06-17 PROBLEM — O43.199 MARGINAL INSERTION OF UMBILICAL CORD AFFECTING MANAGEMENT OF MOTHER: Status: RESOLVED | Noted: 2021-04-19 | Resolved: 2021-06-17

## 2021-06-17 LAB
CONTROL: PRESENT
PREGNANCY TEST URINE, POC: NEGATIVE

## 2021-06-17 PROCEDURE — 58300 INSERT INTRAUTERINE DEVICE: CPT | Performed by: ADVANCED PRACTICE MIDWIFE

## 2021-06-17 PROCEDURE — 81025 URINE PREGNANCY TEST: CPT | Performed by: ADVANCED PRACTICE MIDWIFE

## 2021-06-17 RX ORDER — ESCITALOPRAM OXALATE 10 MG/1
5 TABLET ORAL DAILY
COMMUNITY
Start: 2021-05-27

## 2021-06-17 NOTE — PROGRESS NOTES
SUBJECTIVE:    CC:    Chief Complaint   Patient presents with    Procedure     Mirena insertion       Skye Joe presents today for insertion of Mirena IUD for her complaint of  dysmenorrhea. Skye Joe understands the risks and benefits of the intrauterine device insertion and desires to proceed with its insertion. She is 2 months postpartum. Delivered 2 months ago at [facility] via repeat . Baby was in the unit for 3 weeks. She states she had vaginal bleeding that they thought was coming from uterus a  was done, it was then found that she had a labial laceration that was repaired in OR, it had occurred from intercourse. She was placed on Lexapro by PCP a week ago. Has f/u with PCP in a month. OBJECTIVE:    /73   Pulse 62   Ht 5' 4\" (1.626 m)   Wt 136 lb 12.8 oz (62.1 kg)   LMP 2020 (Exact Date)   BMI 23.48 kg/m²     Gyn history: Negative chlamydia culture or no known exposure in the last 3 months  Medical history: No known contraindication to progestin use. Urine pregnancy test: neg, after procedure pt did report she had unprotected intercourse last night. Discussed emergency contraception and risk for SAB/ectopic if pt is pregnant with IUD in place. Pt would like to keep in and do hcg in 1 week     Procedure: The patient was positioned comfortably on the exam table. After a bi-manual exam; the uterus was found to be  anteverted. There was no cervical motion tenderness. A sterile speculum was inserted. The cervix was visualized and prepped with Betadine. A tenaculum was applied to the anterior lip of the cervix. A sound was placed through the cervical os in sterile fashion and the uterus was sounded to 7 cm. The Mirena IUD was loaded in the applicator and the indicator placed according to the sound. The applicator was then inserted into the cervix and the Intrauterine Device was placed in the endometrial cavity.   The applicator was removed and the strings were trimmed to 2 cm.  The patient tolerated the insertion well. Placement done with Maci BERRY   ASSESSMENT:  IUD insertion  Dysmenorrhea  PPD    PLAN:  1.  IUD counseling was done with Emily. The  Mirena booklet was given to the patient. The consent was signed and scanned into the chart. The Lot # is charted on the CC. Lu Parsons was given the IUD identification card. 2.  IUD warning signs and symptoms were reviewed with Emily. 3.  Post-insertion instructions were also given   4. She will return in 1 month with repeat pap               Post procedure restrictions were reviewed and given to the patient.

## 2021-06-17 NOTE — PROGRESS NOTES
Mirena was inserted into the patient by Elke Vaca pt tolerated well, no adverse reactions noted.     Lot number [id number]  Exp date 10/01/2023  OXS:[id number]

## 2021-07-21 ENCOUNTER — HOSPITAL ENCOUNTER (OUTPATIENT)
Age: 26
Setting detail: SPECIMEN
Discharge: HOME | End: 2021-07-21
Payer: COMMERCIAL

## 2021-07-21 ENCOUNTER — OFFICE VISIT (OUTPATIENT)
Dept: OBGYN CLINIC | Age: 26
End: 2021-07-21
Payer: COMMERCIAL

## 2021-07-21 VITALS
DIASTOLIC BLOOD PRESSURE: 71 MMHG | HEIGHT: 64 IN | BODY MASS INDEX: 23.58 KG/M2 | WEIGHT: 138.1 LBS | HEART RATE: 86 BPM | SYSTOLIC BLOOD PRESSURE: 108 MMHG

## 2021-07-21 DIAGNOSIS — Z30.431 IUD CHECK UP: Primary | ICD-10-CM

## 2021-07-21 DIAGNOSIS — Z87.42 HISTORY OF ABNORMAL CERVICAL PAP SMEAR: ICD-10-CM

## 2021-07-21 PROCEDURE — 1036F TOBACCO NON-USER: CPT | Performed by: ADVANCED PRACTICE MIDWIFE

## 2021-07-21 PROCEDURE — G8420 CALC BMI NORM PARAMETERS: HCPCS | Performed by: ADVANCED PRACTICE MIDWIFE

## 2021-07-21 PROCEDURE — G8427 DOCREV CUR MEDS BY ELIG CLIN: HCPCS | Performed by: ADVANCED PRACTICE MIDWIFE

## 2021-07-21 PROCEDURE — 99213 OFFICE O/P EST LOW 20 MIN: CPT | Performed by: ADVANCED PRACTICE MIDWIFE

## 2021-07-21 RX ORDER — ESCITALOPRAM OXALATE 10 MG/1
10 TABLET ORAL DAILY
Qty: 30 TABLET | Refills: 1 | Status: SHIPPED | OUTPATIENT
Start: 2021-07-21 | End: 2022-01-24 | Stop reason: SDUPTHER

## 2021-07-22 ASSESSMENT — ENCOUNTER SYMPTOMS
ALLERGIC/IMMUNOLOGIC NEGATIVE: 1
GASTROINTESTINAL NEGATIVE: 1
RESPIRATORY NEGATIVE: 1
EYES NEGATIVE: 1

## 2021-07-22 NOTE — PROGRESS NOTES
Malgorzata Child (:  1995) is a 23 y.o. female,Established patient, here for evaluation of the following chief complaint(s):  Follow-up (iud string check )         ASSESSMENT/PLAN:  1. IUD check up  -IUD in place  -Warning signs reviewed  -Discussed checking for strings and expected bleeding pattern    2. Postpartum depression  -     escitalopram (LEXAPRO) 10 MG tablet; Take 1 tablet by mouth daily, Disp-30 tablet, R-1Normal  -Pt plans to f/u with PCP for further management   3. History of abnormal cervical Pap smear  -     PAP Smear; Future       SUBJECTIVE/OBJECTIVE:  Emily presents today for IUD check. She denies any concerns with IUD. Would like to continue with this method. States is unable to do annual visit today but is agreeable to pap that is due. She states she is taking 5mg of Lexapro for PPD. Feels that is has helped but feels that she needs a little higher dose as she feels it wares off by mid day. She has not been able to go to PCP for f/u. Patient's last menstrual period was 2020 (exact date). Review of Systems   Constitutional: Negative. HENT: Negative. Eyes: Negative. Respiratory: Negative. Cardiovascular: Negative. Gastrointestinal: Negative. Endocrine: Negative. Genitourinary: Negative. Musculoskeletal: Negative. Allergic/Immunologic: Negative. Neurological: Negative. Hematological: Negative. Psychiatric/Behavioral: Negative. Physical Exam  Constitutional:       Appearance: Normal appearance. Pulmonary:      Effort: Pulmonary effort is normal.   Abdominal:      General: Abdomen is flat. Palpations: Abdomen is soft. Genitourinary:     General: Normal vulva. Labia:         Right: No rash, tenderness, lesion or injury. Left: No rash, tenderness, lesion or injury.        Vagina: Normal.      Cervix: Normal.      Uterus: Normal.       Adnexa: Right adnexa normal and left adnexa normal.      Comments: +IUD strings    IUD base could not be palpated    . Chaperone for Intimate Exam  Chaperone was offered as part of the rooming process. Patient declined and agrees to continue with exam without a chaperone. Skin:     General: Skin is warm and dry. Capillary Refill: Capillary refill takes less than 2 seconds. Neurological:      Mental Status: She is alert. Psychiatric:         Mood and Affect: Mood normal.         Behavior: Behavior normal.         Thought Content: Thought content normal.         Judgment: Judgment normal.            An electronic signature was used to authenticate this note.     --MARICRUZ Caldera CNM

## 2021-07-29 LAB — CYTOLOGY REPORT: NORMAL

## 2021-08-02 ENCOUNTER — TELEPHONE (OUTPATIENT)
Dept: OBGYN CLINIC | Age: 26
End: 2021-08-02

## 2021-08-02 DIAGNOSIS — R87.610 ASCUS WITH POSITIVE HIGH RISK HPV CERVICAL: ICD-10-CM

## 2021-08-02 DIAGNOSIS — R87.810 ASCUS WITH POSITIVE HIGH RISK HPV CERVICAL: ICD-10-CM

## 2021-08-02 DIAGNOSIS — R87.611 ATYPICAL SQUAMOUS CELLS CANNOT EXCLUDE HIGH GRADE SQUAMOUS INTRAEPITHELIAL LESION ON CYTOLOGIC SMEAR OF CERVIX (ASC-H): Primary | ICD-10-CM

## 2021-08-02 LAB
HPV SAMPLE: ABNORMAL
HPV, GENOTYPE 16: NOT DETECTED
HPV, GENOTYPE 18: NOT DETECTED
HPV, HIGH RISK OTHER: DETECTED
HPV, INTERPRETATION: ABNORMAL
SPECIMEN DESCRIPTION: ABNORMAL

## 2021-08-02 NOTE — TELEPHONE ENCOUNTER
Called and spoke with patient regarding recommendation for colpo as reviewed with Dr. Heidi Cee She states understanding.  Referral placed

## 2021-08-02 NOTE — TELEPHONE ENCOUNTER
Called and discussed abnormal pap results ASCUS but can not exclude high grade +HPV  Informed pt message was sent to Dr. Sarah Rasmussen regarding if pt needs colpo or can watch until neg year. Explained with HPV is and how it is transmitted. Will call patient back with POC  All questions answered.

## 2021-09-21 PROBLEM — R87.810 CERVICAL HIGH RISK HPV (HUMAN PAPILLOMAVIRUS) TEST POSITIVE: Status: ACTIVE | Noted: 2021-09-21

## 2021-09-21 PROBLEM — R87.611 ATYPICAL SQUAMOUS CELLS CANNOT EXCLUDE HIGH GRADE SQUAMOUS INTRAEPITHELIAL LESION ON CYTOLOGIC SMEAR OF CERVIX (ASC-H): Status: ACTIVE | Noted: 2021-09-21

## 2021-09-22 ENCOUNTER — HOSPITAL ENCOUNTER (OUTPATIENT)
Age: 26
Setting detail: SPECIMEN
Discharge: HOME | End: 2021-09-22
Payer: COMMERCIAL

## 2021-09-22 ENCOUNTER — PROCEDURE VISIT (OUTPATIENT)
Dept: OBGYN CLINIC | Age: 26
End: 2021-09-22
Payer: COMMERCIAL

## 2021-09-22 VITALS — BODY MASS INDEX: 24.41 KG/M2 | HEIGHT: 64 IN | WEIGHT: 143 LBS

## 2021-09-22 DIAGNOSIS — R87.611 ATYPICAL SQUAMOUS CELLS CANNOT EXCLUDE HIGH GRADE SQUAMOUS INTRAEPITHELIAL LESION ON CYTOLOGIC SMEAR OF CERVIX (ASC-H): ICD-10-CM

## 2021-09-22 DIAGNOSIS — R87.810 CERVICAL HIGH RISK HPV (HUMAN PAPILLOMAVIRUS) TEST POSITIVE: ICD-10-CM

## 2021-09-22 PROCEDURE — 57454 BX/CURETT OF CERVIX W/SCOPE: CPT | Performed by: OBSTETRICS & GYNECOLOGY
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
## 2021-09-22 NOTE — LETTER
[facility] [place] [address] [place] [address] [address] [address]  Phone: [phone]  Fax: [phone]    Olga Taylor MD        September 22, 2021     Patient: Radha Flowers   YOB: 1995   Date of Visit: 9/22/2021       To Whom It May Concern:    Scott Nugent was seen in my office 9/22/21. It is my medical opinion that Radha Flowers may return to work on 9/24/21. If you have any questions or concerns, please don't hesitate to call.     Sincerely,        Olga Taylor MD

## 2021-09-24 PROBLEM — N87.0 DYSPLASIA OF CERVIX, LOW GRADE (CIN 1): Status: ACTIVE | Noted: 2021-09-24

## 2021-09-24 LAB — SURGICAL PATHOLOGY REPORT: NORMAL

## 2021-10-01 ENCOUNTER — PATIENT MESSAGE (OUTPATIENT)
Dept: OBGYN CLINIC | Age: 26
End: 2021-10-01

## 2021-10-04 NOTE — TELEPHONE ENCOUNTER
From: Ashley Freeman  To: MARICRUZ Salinas CNM  Sent: 10/1/2021 7:10 PM EDT  Subject: Prescription Question    Ludmila Zendejas, I am falling more backwards it's like my medication ain't working anymore like the dose ain't strong enough. I feel like I'm going back into depression hard.

## 2021-12-29 ENCOUNTER — PATIENT MESSAGE (OUTPATIENT)
Dept: OBGYN CLINIC | Age: 26
End: 2021-12-29

## 2021-12-29 NOTE — TELEPHONE ENCOUNTER
From: Acacia Flynn  To: Keila Hoff APRN - CNM  Sent: 12/29/2021 11:06 AM EST  Subject: Bakari Kern. [facility] I went to the eye doctor today and they said that my right eye was messed up from straining and its caused from the IUD I havent was that one of the side effects on there for the IUD? Because if so I really want this thing taken out because I do not want to lose my vision.

## 2022-01-24 RX ORDER — ESCITALOPRAM OXALATE 10 MG/1
20 TABLET ORAL DAILY
Qty: 30 TABLET | Refills: 1 | Status: SHIPPED | OUTPATIENT
Start: 2022-01-24

## 2022-01-24 NOTE — TELEPHONE ENCOUNTER
Lexapro 20mg daily sent in for patient. Pt is struggling with increase in depression. Recommended pt be seen ASAP for offical evaluation and documentation. I feel imperative pt's medication be adjusted d/t providers concern for worsening symptoms.

## 2024-04-18 ENCOUNTER — OFFICE VISIT (OUTPATIENT)
Dept: OBGYN CLINIC | Age: 29
End: 2024-04-18
Payer: COMMERCIAL

## 2024-04-18 ENCOUNTER — HOSPITAL ENCOUNTER (OUTPATIENT)
Age: 29
Setting detail: SPECIMEN
Discharge: HOME | End: 2024-04-18

## 2024-04-18 VITALS
HEART RATE: 84 BPM | SYSTOLIC BLOOD PRESSURE: 114 MMHG | WEIGHT: 159.6 LBS | HEIGHT: 64 IN | BODY MASS INDEX: 27.25 KG/M2 | DIASTOLIC BLOOD PRESSURE: 72 MMHG

## 2024-04-18 DIAGNOSIS — Z12.4 CERVICAL CANCER SCREENING: ICD-10-CM

## 2024-04-18 DIAGNOSIS — Z30.431 INTRAUTERINE DEVICE SURVEILLANCE: ICD-10-CM

## 2024-04-18 DIAGNOSIS — Z01.419 WELL WOMAN EXAM WITH ROUTINE GYNECOLOGICAL EXAM: Primary | ICD-10-CM

## 2024-04-18 DIAGNOSIS — Z11.3 ROUTINE SCREENING FOR STI (SEXUALLY TRANSMITTED INFECTION): ICD-10-CM

## 2024-04-18 PROCEDURE — 99395 PREV VISIT EST AGE 18-39: CPT | Performed by: ADVANCED PRACTICE MIDWIFE

## 2024-04-18 RX ORDER — CITALOPRAM 20 MG/1
1 TABLET ORAL EVERY MORNING
COMMUNITY
Start: 2024-03-07

## 2024-04-18 SDOH — ECONOMIC STABILITY: FOOD INSECURITY: WITHIN THE PAST 12 MONTHS, YOU WORRIED THAT YOUR FOOD WOULD RUN OUT BEFORE YOU GOT MONEY TO BUY MORE.: NEVER TRUE

## 2024-04-18 SDOH — ECONOMIC STABILITY: FOOD INSECURITY: WITHIN THE PAST 12 MONTHS, THE FOOD YOU BOUGHT JUST DIDN'T LAST AND YOU DIDN'T HAVE MONEY TO GET MORE.: NEVER TRUE

## 2024-04-18 SDOH — ECONOMIC STABILITY: HOUSING INSECURITY
IN THE LAST 12 MONTHS, WAS THERE A TIME WHEN YOU DID NOT HAVE A STEADY PLACE TO SLEEP OR SLEPT IN A SHELTER (INCLUDING NOW)?: NO

## 2024-04-18 SDOH — ECONOMIC STABILITY: INCOME INSECURITY: HOW HARD IS IT FOR YOU TO PAY FOR THE VERY BASICS LIKE FOOD, HOUSING, MEDICAL CARE, AND HEATING?: NOT HARD AT ALL

## 2024-04-18 ASSESSMENT — PATIENT HEALTH QUESTIONNAIRE - PHQ9
4. FEELING TIRED OR HAVING LITTLE ENERGY: NOT AT ALL
3. TROUBLE FALLING OR STAYING ASLEEP: NOT AT ALL
SUM OF ALL RESPONSES TO PHQ QUESTIONS 1-9: 2
10. IF YOU CHECKED OFF ANY PROBLEMS, HOW DIFFICULT HAVE THESE PROBLEMS MADE IT FOR YOU TO DO YOUR WORK, TAKE CARE OF THINGS AT HOME, OR GET ALONG WITH OTHER PEOPLE: NOT DIFFICULT AT ALL
1. LITTLE INTEREST OR PLEASURE IN DOING THINGS: MORE THAN HALF THE DAYS
2. FEELING DOWN, DEPRESSED OR HOPELESS: NOT AT ALL
6. FEELING BAD ABOUT YOURSELF - OR THAT YOU ARE A FAILURE OR HAVE LET YOURSELF OR YOUR FAMILY DOWN: NOT AT ALL
5. POOR APPETITE OR OVEREATING: NOT AT ALL
8. MOVING OR SPEAKING SO SLOWLY THAT OTHER PEOPLE COULD HAVE NOTICED. OR THE OPPOSITE, BEING SO FIGETY OR RESTLESS THAT YOU HAVE BEEN MOVING AROUND A LOT MORE THAN USUAL: NOT AT ALL
SUM OF ALL RESPONSES TO PHQ9 QUESTIONS 1 & 2: 2
SUM OF ALL RESPONSES TO PHQ QUESTIONS 1-9: 2
9. THOUGHTS THAT YOU WOULD BE BETTER OFF DEAD, OR OF HURTING YOURSELF: NOT AT ALL
7. TROUBLE CONCENTRATING ON THINGS, SUCH AS READING THE NEWSPAPER OR WATCHING TELEVISION: NOT AT ALL
SUM OF ALL RESPONSES TO PHQ QUESTIONS 1-9: 2
SUM OF ALL RESPONSES TO PHQ QUESTIONS 1-9: 2

## 2024-04-18 ASSESSMENT — ANXIETY QUESTIONNAIRES
6. BECOMING EASILY ANNOYED OR IRRITABLE: SEVERAL DAYS
1. FEELING NERVOUS, ANXIOUS, OR ON EDGE: MORE THAN HALF THE DAYS
2. NOT BEING ABLE TO STOP OR CONTROL WORRYING: SEVERAL DAYS
GAD7 TOTAL SCORE: 8
3. WORRYING TOO MUCH ABOUT DIFFERENT THINGS: MORE THAN HALF THE DAYS
7. FEELING AFRAID AS IF SOMETHING AWFUL MIGHT HAPPEN: NOT AT ALL
4. TROUBLE RELAXING: MORE THAN HALF THE DAYS
IF YOU CHECKED OFF ANY PROBLEMS ON THIS QUESTIONNAIRE, HOW DIFFICULT HAVE THESE PROBLEMS MADE IT FOR YOU TO DO YOUR WORK, TAKE CARE OF THINGS AT HOME, OR GET ALONG WITH OTHER PEOPLE: NOT DIFFICULT AT ALL
5. BEING SO RESTLESS THAT IT IS HARD TO SIT STILL: NOT AT ALL

## 2024-04-18 NOTE — PROGRESS NOTES
Patient is here for annual with pap.    Chaperone for Intimate Exam  Chaperone was offered as part of the rooming process. Patient declined and agrees to continue with exam without a chaperone.  Chaperone:     
for approximately 50-60% of all cases of cervical cancer worldwide and HPV-18 is the next most associated with cervical cancer and is responsible for 10-15% of cases of cervical cancer.  HPV infection is most common in teenagers and women in their early 20's. Reviewed most young women, especially those younger than 21 years, have an effective immune response that clears the infection on it's own.  Discussed HPV infection detected in women older than 30 years old is more likely to reflect persistent infection.  Discussed that screen should began at age 21 regardless of the age of sexual initiation.   Reviewed that HPV is acquired through sexual intercourse   Discussed most HPV-related types of cervical neoplasia progress very slowly.   [] Mammograms (started at 40yrs old):   [] BRCA testing risk assessment:   [] Colon cancer screening:   [] Skin Cancer Screening:

## 2024-04-19 DIAGNOSIS — Z11.3 ROUTINE SCREENING FOR STI (SEXUALLY TRANSMITTED INFECTION): ICD-10-CM

## 2024-04-19 LAB
C TRACH DNA SPEC QL PROBE+SIG AMP: NEGATIVE
N GONORRHOEA DNA SPEC QL PROBE+SIG AMP: NEGATIVE
SPECIMEN DESCRIPTION: NORMAL

## 2024-05-01 LAB — CYTOLOGY REPORT: NORMAL

## 2025-05-16 ENCOUNTER — OFFICE VISIT (OUTPATIENT)
Age: 30
End: 2025-05-16

## 2025-05-16 VITALS
HEART RATE: 86 BPM | BODY MASS INDEX: 28.51 KG/M2 | TEMPERATURE: 98.5 F | DIASTOLIC BLOOD PRESSURE: 70 MMHG | HEIGHT: 64 IN | OXYGEN SATURATION: 96 % | SYSTOLIC BLOOD PRESSURE: 100 MMHG | WEIGHT: 167 LBS

## 2025-05-16 DIAGNOSIS — L01.00 IMPETIGO: Primary | ICD-10-CM

## 2025-05-16 RX ORDER — CLONAZEPAM 0.5 MG/1
20 TABLET ORAL
COMMUNITY

## 2025-05-16 RX ORDER — MUPIROCIN 20 MG/G
OINTMENT TOPICAL
Qty: 22 G | Refills: 0 | Status: SHIPPED | OUTPATIENT
Start: 2025-05-16 | End: 2025-05-23

## 2025-05-16 ASSESSMENT — ENCOUNTER SYMPTOMS
ABDOMINAL PAIN: 0
COUGH: 0
SORE THROAT: 0
SHORTNESS OF BREATH: 0

## 2025-05-16 NOTE — PROGRESS NOTES
[facility], [facility] ([place])  [facility]  [address] [address]  Dept: [phone]    Date: 25    Emily Morejon (:  1995) is a 29 y.o. female, here for evaluation of the following chief complaint(s):  Mouth Lesions      HPI    History provided by:  Patient   used: No    Mouth Lesions   The current episode started 2 days ago. The onset was sudden. The problem occurs rarely. Nothing relieves the symptoms. Nothing aggravates the symptoms. Associated symptoms include mouth sores. Pertinent negatives include no fever, no abdominal pain, no congestion, no ear pain, no headaches, no sore throat and no cough.        ROS  Review of Systems   Constitutional:  Negative for chills and fever.   HENT:  Positive for mouth sores. Negative for congestion, ear pain and sore throat.    Respiratory:  Negative for cough and shortness of breath.    Cardiovascular:  Negative for chest pain.   Gastrointestinal:  Negative for abdominal pain.   Neurological:  Negative for dizziness and headaches.   All other systems reviewed and are negative.      PHYSICAL EXAM  Vitals:    25 0844   BP: 100/70   Pulse: 86   Temp: 98.5 °F (36.9 °C)   SpO2: 96%   Weight: 75.8 kg (167 lb)   Height: 1.626 m (5' 4\")     Physical Exam  Vitals reviewed.   Constitutional:       Appearance: Normal appearance.   HENT:      Right Ear: Tympanic membrane normal.      Left Ear: Tympanic membrane normal.      Nose: Nose normal.      Mouth/Throat:      Mouth: Mucous membranes are moist.   Eyes:      Extraocular Movements: Extraocular movements intact.      Pupils: Pupils are equal, round, and reactive to light.   Cardiovascular:      Rate and Rhythm: Normal rate and regular rhythm.   Pulmonary:      Effort: Pulmonary effort is normal.      Breath sounds: Normal breath sounds.   Abdominal:      General: Abdomen is flat. Bowel sounds are normal.      Palpations: Abdomen is soft.